# Patient Record
Sex: FEMALE | Race: WHITE | NOT HISPANIC OR LATINO | Employment: OTHER | URBAN - METROPOLITAN AREA
[De-identification: names, ages, dates, MRNs, and addresses within clinical notes are randomized per-mention and may not be internally consistent; named-entity substitution may affect disease eponyms.]

---

## 2017-12-05 ENCOUNTER — HOSPITAL ENCOUNTER (EMERGENCY)
Facility: MEDICAL CENTER | Age: 72
End: 2017-12-05
Attending: EMERGENCY MEDICINE
Payer: MEDICARE

## 2017-12-05 ENCOUNTER — APPOINTMENT (OUTPATIENT)
Dept: RADIOLOGY | Facility: MEDICAL CENTER | Age: 72
End: 2017-12-05
Attending: EMERGENCY MEDICINE
Payer: MEDICARE

## 2017-12-05 VITALS
HEIGHT: 62 IN | BODY MASS INDEX: 26.25 KG/M2 | WEIGHT: 142.64 LBS | SYSTOLIC BLOOD PRESSURE: 145 MMHG | OXYGEN SATURATION: 93 % | DIASTOLIC BLOOD PRESSURE: 69 MMHG | RESPIRATION RATE: 16 BRPM | HEART RATE: 94 BPM | TEMPERATURE: 98.2 F

## 2017-12-05 DIAGNOSIS — K52.9 ENTERITIS: ICD-10-CM

## 2017-12-05 DIAGNOSIS — R10.30 LOWER ABDOMINAL PAIN: ICD-10-CM

## 2017-12-05 LAB
ALBUMIN SERPL BCP-MCNC: 3.9 G/DL (ref 3.2–4.9)
ALBUMIN/GLOB SERPL: 1.6 G/DL
ALP SERPL-CCNC: 66 U/L (ref 30–99)
ALT SERPL-CCNC: 21 U/L (ref 2–50)
ANION GAP SERPL CALC-SCNC: 9 MMOL/L (ref 0–11.9)
APPEARANCE UR: CLEAR
AST SERPL-CCNC: 29 U/L (ref 12–45)
BACTERIA #/AREA URNS HPF: ABNORMAL /HPF
BASOPHILS # BLD AUTO: 0.1 % (ref 0–1.8)
BASOPHILS # BLD: 0.01 K/UL (ref 0–0.12)
BILIRUB SERPL-MCNC: 0.9 MG/DL (ref 0.1–1.5)
BILIRUB UR QL STRIP.AUTO: ABNORMAL
BUN SERPL-MCNC: 17 MG/DL (ref 8–22)
CALCIUM SERPL-MCNC: 8.7 MG/DL (ref 8.4–10.2)
CHLORIDE SERPL-SCNC: 104 MMOL/L (ref 96–112)
CO2 SERPL-SCNC: 24 MMOL/L (ref 20–33)
COLOR UR: YELLOW
CREAT SERPL-MCNC: 1.11 MG/DL (ref 0.5–1.4)
CULTURE IF INDICATED INDCX: YES UA CULTURE
EOSINOPHIL # BLD AUTO: 0.03 K/UL (ref 0–0.51)
EOSINOPHIL NFR BLD: 0.2 % (ref 0–6.9)
EPI CELLS #/AREA URNS HPF: ABNORMAL /HPF
ERYTHROCYTE [DISTWIDTH] IN BLOOD BY AUTOMATED COUNT: 40.9 FL (ref 35.9–50)
FLUAV+FLUBV AG SPEC QL IA: NORMAL
GFR SERPL CREATININE-BSD FRML MDRD: 48 ML/MIN/1.73 M 2
GLOBULIN SER CALC-MCNC: 2.4 G/DL (ref 1.9–3.5)
GLUCOSE SERPL-MCNC: 149 MG/DL (ref 65–99)
GLUCOSE UR STRIP.AUTO-MCNC: NEGATIVE MG/DL
HCT VFR BLD AUTO: 46.8 % (ref 37–47)
HGB BLD-MCNC: 16 G/DL (ref 12–16)
IMM GRANULOCYTES # BLD AUTO: 0.04 K/UL (ref 0–0.11)
IMM GRANULOCYTES NFR BLD AUTO: 0.3 % (ref 0–0.9)
KETONES UR STRIP.AUTO-MCNC: 15 MG/DL
LEUKOCYTE ESTERASE UR QL STRIP.AUTO: NEGATIVE
LIPASE SERPL-CCNC: 19 U/L (ref 7–58)
LYMPHOCYTES # BLD AUTO: 0.49 K/UL (ref 1–4.8)
LYMPHOCYTES NFR BLD: 3.9 % (ref 22–41)
MCH RBC QN AUTO: 30.4 PG (ref 27–33)
MCHC RBC AUTO-ENTMCNC: 34.2 G/DL (ref 33.6–35)
MCV RBC AUTO: 89 FL (ref 81.4–97.8)
MICRO URNS: ABNORMAL
MONOCYTES # BLD AUTO: 0.54 K/UL (ref 0–0.85)
MONOCYTES NFR BLD AUTO: 4.3 % (ref 0–13.4)
MUCOUS THREADS #/AREA URNS HPF: ABNORMAL /HPF
NEUTROPHILS # BLD AUTO: 11.44 K/UL (ref 2–7.15)
NEUTROPHILS NFR BLD: 91.2 % (ref 44–72)
NITRITE UR QL STRIP.AUTO: NEGATIVE
NRBC # BLD AUTO: 0 K/UL
NRBC BLD AUTO-RTO: 0 /100 WBC
PH UR STRIP.AUTO: 6.5 [PH]
PLATELET # BLD AUTO: 192 K/UL (ref 164–446)
PMV BLD AUTO: 8.8 FL (ref 9–12.9)
POTASSIUM SERPL-SCNC: 3.9 MMOL/L (ref 3.6–5.5)
PROT SERPL-MCNC: 6.3 G/DL (ref 6–8.2)
PROT UR QL STRIP: NEGATIVE MG/DL
RBC # BLD AUTO: 5.26 M/UL (ref 4.2–5.4)
RBC # URNS HPF: ABNORMAL /HPF
RBC UR QL AUTO: ABNORMAL
SIGNIFICANT IND 70042: NORMAL
SITE SITE: NORMAL
SODIUM SERPL-SCNC: 137 MMOL/L (ref 135–145)
SOURCE SOURCE: NORMAL
SP GR UR STRIP.AUTO: 1.01
WBC # BLD AUTO: 12.6 K/UL (ref 4.8–10.8)
WBC #/AREA URNS HPF: ABNORMAL /HPF

## 2017-12-05 PROCEDURE — 96374 THER/PROPH/DIAG INJ IV PUSH: CPT | Mod: XU

## 2017-12-05 PROCEDURE — 700117 HCHG RX CONTRAST REV CODE 255: Performed by: EMERGENCY MEDICINE

## 2017-12-05 PROCEDURE — 36415 COLL VENOUS BLD VENIPUNCTURE: CPT

## 2017-12-05 PROCEDURE — 87086 URINE CULTURE/COLONY COUNT: CPT

## 2017-12-05 PROCEDURE — 87400 INFLUENZA A/B EACH AG IA: CPT

## 2017-12-05 PROCEDURE — 700111 HCHG RX REV CODE 636 W/ 250 OVERRIDE (IP): Performed by: EMERGENCY MEDICINE

## 2017-12-05 PROCEDURE — 83690 ASSAY OF LIPASE: CPT

## 2017-12-05 PROCEDURE — 85025 COMPLETE CBC W/AUTO DIFF WBC: CPT

## 2017-12-05 PROCEDURE — 700102 HCHG RX REV CODE 250 W/ 637 OVERRIDE(OP): Performed by: EMERGENCY MEDICINE

## 2017-12-05 PROCEDURE — 81001 URINALYSIS AUTO W/SCOPE: CPT

## 2017-12-05 PROCEDURE — 80053 COMPREHEN METABOLIC PANEL: CPT

## 2017-12-05 PROCEDURE — 99285 EMERGENCY DEPT VISIT HI MDM: CPT

## 2017-12-05 PROCEDURE — 700105 HCHG RX REV CODE 258: Performed by: EMERGENCY MEDICINE

## 2017-12-05 PROCEDURE — A9270 NON-COVERED ITEM OR SERVICE: HCPCS | Performed by: EMERGENCY MEDICINE

## 2017-12-05 PROCEDURE — 74177 CT ABD & PELVIS W/CONTRAST: CPT

## 2017-12-05 RX ORDER — ONDANSETRON 2 MG/ML
4 INJECTION INTRAMUSCULAR; INTRAVENOUS ONCE
Status: COMPLETED | OUTPATIENT
Start: 2017-12-05 | End: 2017-12-05

## 2017-12-05 RX ORDER — DICYCLOMINE HCL 20 MG
20 TABLET ORAL ONCE
Status: COMPLETED | OUTPATIENT
Start: 2017-12-05 | End: 2017-12-05

## 2017-12-05 RX ORDER — DICYCLOMINE HCL 20 MG
20 TABLET ORAL EVERY 6 HOURS
Qty: 30 TAB | Refills: 0 | Status: SHIPPED | OUTPATIENT
Start: 2017-12-05 | End: 2019-06-17

## 2017-12-05 RX ORDER — SODIUM CHLORIDE 9 MG/ML
1000 INJECTION, SOLUTION INTRAVENOUS ONCE
Status: COMPLETED | OUTPATIENT
Start: 2017-12-05 | End: 2017-12-05

## 2017-12-05 RX ORDER — ONDANSETRON 4 MG/1
4 TABLET, ORALLY DISINTEGRATING ORAL EVERY 8 HOURS PRN
Qty: 10 TAB | Refills: 0 | Status: SHIPPED | OUTPATIENT
Start: 2017-12-05

## 2017-12-05 RX ADMIN — SODIUM CHLORIDE 1000 ML: 9 INJECTION, SOLUTION INTRAVENOUS at 18:14

## 2017-12-05 RX ADMIN — DICYCLOMINE HYDROCHLORIDE 20 MG: 20 TABLET ORAL at 18:14

## 2017-12-05 RX ADMIN — ONDANSETRON 4 MG: 2 INJECTION INTRAMUSCULAR; INTRAVENOUS at 18:14

## 2017-12-05 RX ADMIN — IOHEXOL 100 ML: 350 INJECTION, SOLUTION INTRAVENOUS at 18:59

## 2017-12-05 ASSESSMENT — PAIN SCALES - GENERAL: PAINLEVEL_OUTOF10: 9

## 2017-12-06 NOTE — ED NOTES
Discharge instructions provided.  Pt verbalized the understanding of discharge instructions to follow up with PCP and to return to ER if condition worsens.  Pt ambulated out of ER without difficulty.   Patient educated on 2 new prescriptions. Given paper prescriptions. Educated on return precautions. Educated to follow up with PCP.

## 2017-12-06 NOTE — ED PROVIDER NOTES
"ED Provider Note    CHIEF COMPLAINT  Chief Complaint   Patient presents with   • Abdominal Pain     Started at 0900 this am. Did not eat anything new, was at friend's house with flu on Sunday   • Nausea   • Diarrhea       HPI  Princess Gonsalves is a 72 y.o. female who presentsWith lower abdominal pain. This started this morning after she ate a very small breakfast. She has had waves of nausea and dry heaving. She started having some diarrhea she has had 4-5 episodes of diarrhea this morning. She does feel bloated. She has a prior history of hysterectomy and appendectomy. She denies any fevers. No shortness of breath. No recent sick contacts. The pain is pronounced in her lower abdomen that radiates to the upper abdomen. She does not attend for anything that makes it better or worse.      REVIEW OF SYSTEMS  positive for lower abdominal pain and distention diarrhea nausea, negative for fevers or dysuria. All other systems are negative.     PAST MEDICAL HISTORY   has a past medical history of Migraines.    SOCIAL HISTORY  Social History     Social History Main Topics   • Smoking status: Never Smoker   • Smokeless tobacco: Never Used   • Alcohol use No   • Drug use: No   • Sexual activity: Not on file       SURGICAL HISTORY   has a past surgical history that includes hysterectomy laparoscopy.    CURRENT MEDICATIONS  Home Medications    **Home medications have not yet been reviewed for this encounter**         ALLERGIES  Allergies   Allergen Reactions   • Codeine Itching   • Vicodin [Hydrocodone-Acetaminophen] Itching       PHYSICAL EXAM  VITAL SIGNS: /69   Pulse 94   Temp 36.8 °C (98.2 °F)   Resp 16   Ht 1.575 m (5' 2\")   Wt 64.7 kg (142 lb 10.2 oz)   SpO2 93%   BMI 26.09 kg/m² .  Constitutional: Alert in no apparent distress.  HENT: No signs of trauma, Bilateral external ears normal, Nose normal.   Eyes: Pupils are equal and reactive, Conjunctiva normal, Non-icteric.   Neck: Normal range of motion, No " tenderness, Supple, No stridor.   Cardiovascular: Regular rate and rhythm, no murmurs.   Thorax & Lungs: Normal breath sounds, No respiratory distress, No wheezing, No chest tenderness.   Abdomen: Bowel sounds normal, Soft, No tenderness, No masses, No peritoneal signs.  Skin: Warm, Dry, No erythema, No rash.   Back: No bony tenderness, No CVA tenderness.   Musculoskeletal:  no major deformities noted.   Neurologic: Alert,  No focal deficits noted.   Psychiatric: Affect normal, Judgment normal, Mood normal.       DIAGNOSTIC STUDIES / PROCEDURES        LABS  Labs Reviewed   CBC WITH DIFFERENTIAL - Abnormal; Notable for the following:        Result Value    WBC 12.6 (*)     MPV 8.8 (*)     Neutrophils-Polys 91.20 (*)     Lymphocytes 3.90 (*)     Neutrophils (Absolute) 11.44 (*)     Lymphs (Absolute) 0.49 (*)     All other components within normal limits   COMP METABOLIC PANEL - Abnormal; Notable for the following:     Glucose 149 (*)     All other components within normal limits   URINALYSIS,CULTURE IF INDICATED - Abnormal; Notable for the following:     Ketones 15 (*)     Bilirubin Small (*)     Occult Blood Trace (*)     All other components within normal limits   ESTIMATED GFR - Abnormal; Notable for the following:     GFR If  58 (*)     GFR If Non  48 (*)     All other components within normal limits   URINE MICROSCOPIC (W/UA) - Abnormal; Notable for the following:     RBC 2-5 (*)     Bacteria Few (*)     All other components within normal limits   LIPASE   INFLUENZA RAPID   URINE CULTURE(NEW)       RADIOLOGY  CT-ABDOMEN-PELVIS WITH   Final Result      1.  Mild volume of fluid throughout the small bowel may represent enteritis. No mucosal thickening or mesenteric inflammation.      2.  No evidence of colonic obstruction or inflammation.      3.  No renal obstruction.      4.  Normal appearance of the appendix and gallbladder.            COURSE & MEDICAL DECISION MAKING  Pertinent  Labs & Imaging studies reviewed. (See chart for details)  This is a 72-year-old female presenting with lower abdominal pain. She has acute onset of symptoms of diarrhea and nausea. Differential includes but is not limited to dehydration, enteritis, diverticulitis.    Laboratory evaluation shows a slightly elevated white blood cell count with left shift. CMP shows essentially normal electrolytes. Urinalysis does not show any evidence of infection. She was given IV fluids for presumed dehydration given her diarrhea and nausea. CT was performed. There does not appear to be any evidence of diverticulitis. She does have some fluid throughout the small bowel which may represent enteritis. I do think this is consistent with her symptoms. Do not think she requires antibiotics at this time she is not febrile. She will be managed symptomatically with Zofran and Bentyl. She did have these in the emergency department with some improvement of her symptoms. I do think she can be treated as an outpatient at this time.  She was agreeable to this and will return for any worsening symptoms.      Your blood pressure is elevated here in the emergency department. Please monitor your blood pressure over the next several days. If your blood pressure continues to be 120/80 or higher please contact your physician for blood pressure management.      The patient will return for new or worsening symptoms and is stable at the time of discharge. Patient was given return precautions. Patient and/or family member verbalizes understanding and will comply.    DISPOSITION:  Patient will be discharged home in stable condition.    FOLLOW UP:  [facility], Emergency Dept  [address]  [place] [id number]  [phone]    return for worsening pain, fevers, vomiting or other concerns. Please follow-up with your primary care doctor your blood pressure is slightly elevated.      OUTPATIENT MEDICATIONS:  Discharge  Medication List as of 12/5/2017  8:47 PM      START taking these medications    Details   ondansetron (ZOFRAN ODT) 4 MG TABLET DISPERSIBLE Take 1 Tab by mouth every 8 hours as needed., Disp-10 Tab, R-0, Print Rx Paper      dicyclomine (BENTYL) 20 MG Tab Take 1 Tab by mouth every 6 hours.PRN for bowel spasmsDisp-30 Tab, R-0, Print Rx Paper               FINAL IMPRESSION  1. Enteritis    2. Lower abdominal pain        2.   3.    This dictation has been creating using voice recognition software. The accuracy of the dictation is limited the abilities of the software.  I expect there may be some errors of grammar and possibly content. I made every attempt to manually correct the errors within my dictation. However errors related to this voice recognition software may still exist and should be interpreted within the appropriate context.      The note accurately reflects work and decisions made by me.  Carly Hanna  12/5/2017  10:23 PM

## 2017-12-06 NOTE — DISCHARGE INSTRUCTIONS
Abdominal Pain (Nonspecific)  Your exam might not show the exact reason you have abdominal pain. Since there are many different causes of abdominal pain, another checkup and more tests may be needed. It is very important to follow up for lasting (persistent) or worsening symptoms. A possible cause of abdominal pain in any person who still has his or her appendix is acute appendicitis. Appendicitis is often hard to diagnose. Normal blood tests, urine tests, ultrasound, and CT scans do not completely rule out early appendicitis or other causes of abdominal pain. Sometimes, only the changes that happen over time will allow appendicitis and other causes of abdominal pain to be determined. Other potential problems that may require surgery may also take time to become more apparent. Because of this, it is important that you follow all of the instructions below.  HOME CARE INSTRUCTIONS   · Rest as much as possible.   · Do not eat solid food until your pain is gone.   · While adults or children have pain: A diet of water, weak decaffeinated tea, broth or bouillon, gelatin, oral rehydration solutions (ORS), frozen ice pops, or ice chips may be helpful.   · When pain is gone in adults or children: Start a light diet (dry toast, crackers, applesauce, or white rice). Increase the diet slowly as long as it does not bother you. Eat no dairy products (including cheese and eggs) and no spicy, fatty, fried, or high-fiber foods.   · Use no alcohol, caffeine, or cigarettes.   · Take your regular medicines unless your caregiver told you not to.   · Take any prescribed medicine as directed.   · Only take over-the-counter or prescription medicines for pain, discomfort, or fever as directed by your caregiver. Do not give aspirin to children.   If your caregiver has given you a follow-up appointment, it is very important to keep that appointment. Not keeping the appointment could result in a permanent injury and/or lasting (chronic) pain  and/or disability. If there is any problem keeping the appointment, you must call to reschedule.   SEEK IMMEDIATE MEDICAL CARE IF:   · Your pain is not gone in 24 hours.   · Your pain becomes worse, changes location, or feels different.   · You or your child has an oral temperature above 102° F (38.9° C), not controlled by medicine.   · Your baby is older than 3 months with a rectal temperature of 102° F (38.9° C) or higher.   · Your baby is 3 months old or younger with a rectal temperature of 100.4° F (38° C) or higher.   · You have shaking chills.   · You keep throwing up (vomiting) or cannot drink liquids.   · There is blood in your vomit or you see blood in your bowel movements.   · Your bowel movements become dark or black.   · You have frequent bowel movements.   · Your bowel movements stop (become blocked) or you cannot pass gas.   · You have bloody, frequent, or painful urination.   · You have yellow discoloration in the skin or whites of the eyes.   · Your stomach becomes bloated or bigger.   · You have dizziness or fainting.   · You have chest or back pain.   MAKE SURE YOU:   · Understand these instructions.   · Will watch your condition.   · Will get help right away if you are not doing well or get worse.   Document Released: 12/18/2006 Document Revised: 03/11/2013 Document Reviewed: 11/15/2010  [employer]® Patient Information ©2013 [employer].Viral Gastroenteritis  Viral gastroenteritis is also known as stomach flu. This condition affects the stomach and intestinal tract. It can cause sudden diarrhea and vomiting. The illness typically lasts 3 to 8 days. Most people develop an immune response that eventually gets rid of the virus. While this natural response develops, the virus can make you quite ill.  CAUSES   Many different viruses can cause gastroenteritis, such as rotavirus or noroviruses. You can catch one of these viruses by consuming contaminated food or water. You may also catch a virus by  sharing utensils or other personal items with an infected person or by touching a contaminated surface.  SYMPTOMS   The most common symptoms are diarrhea and vomiting. These problems can cause a severe loss of body fluids (dehydration) and a body salt (electrolyte) imbalance. Other symptoms may include:  · Fever.  · Headache.  · Fatigue.  · Abdominal pain.  DIAGNOSIS   Your caregiver can usually diagnose viral gastroenteritis based on your symptoms and a physical exam. A stool sample may also be taken to test for the presence of viruses or other infections.  TREATMENT   This illness typically goes away on its own. Treatments are aimed at rehydration. The most serious cases of viral gastroenteritis involve vomiting so severely that you are not able to keep fluids down. In these cases, fluids must be given through an intravenous line (IV).  HOME CARE INSTRUCTIONS   · Drink enough fluids to keep your urine clear or pale yellow. Drink small amounts of fluids frequently and increase the amounts as tolerated.  · Ask your caregiver for specific rehydration instructions.  · Avoid:  ¨ Foods high in sugar.  ¨ Alcohol.  ¨ Carbonated drinks.  ¨ Tobacco.  ¨ Juice.  ¨ Caffeine drinks.  ¨ Extremely hot or cold fluids.  ¨ Fatty, greasy foods.  ¨ Too much intake of anything at one time.  ¨ Dairy products until 24 to 48 hours after diarrhea stops.  · You may consume probiotics. Probiotics are active cultures of beneficial bacteria. They may lessen the amount and number of diarrheal stools in adults. Probiotics can be found in yogurt with active cultures and in supplements.  · Wash your hands well to avoid spreading the virus.  · Only take over-the-counter or prescription medicines for pain, discomfort, or fever as directed by your caregiver. Do not give aspirin to children. Antidiarrheal medicines are not recommended.  · Ask your caregiver if you should continue to take your regular prescribed and over-the-counter medicines.  · Keep  all follow-up appointments as directed by your caregiver.  SEEK IMMEDIATE MEDICAL CARE IF:   · You are unable to keep fluids down.  · You do not urinate at least once every 6 to 8 hours.  · You develop shortness of breath.  · You notice blood in your stool or vomit. This may look like coffee grounds.  · You have abdominal pain that increases or is concentrated in one small area (localized).  · You have persistent vomiting or diarrhea.  · You have a fever.  · The patient is a child younger than 3 months, and he or she has a fever.  · The patient is a child older than 3 months, and he or she has a fever and persistent symptoms.  · The patient is a child older than 3 months, and he or she has a fever and symptoms suddenly get worse.  · The patient is a baby, and he or she has no tears when crying.  MAKE SURE YOU:   · Understand these instructions.  · Will watch your condition.  · Will get help right away if you are not doing well or get worse.     This information is not intended to replace advice given to you by your health care provider. Make sure you discuss any questions you have with your health care provider.     Document Released: 12/18/2006 Document Revised: 03/11/2013 Document Reviewed: 10/03/2012  [employer] Patient Education ©2016 [employer]

## 2017-12-06 NOTE — ED NOTES
"Chief Complaint   Patient presents with   • Abdominal Pain     Started at 0900 this am. Did not eat anything new, was at friend's house with flu on Sunday   • Nausea   • Diarrhea     /76   Pulse (!) 116   Temp 36.8 °C (98.2 °F)   Resp 18   Ht 1.575 m (5' 2\")   Wt 64.7 kg (142 lb 10.2 oz)   SpO2 97%   BMI 26.09 kg/m²     "

## 2017-12-08 LAB
BACTERIA UR CULT: NORMAL
SIGNIFICANT IND 70042: NORMAL
SITE SITE: NORMAL
SOURCE SOURCE: NORMAL

## 2019-06-17 ENCOUNTER — HOSPITAL ENCOUNTER (EMERGENCY)
Facility: MEDICAL CENTER | Age: 74
End: 2019-06-17
Attending: EMERGENCY MEDICINE
Payer: MEDICARE

## 2019-06-17 VITALS
BODY MASS INDEX: 25.88 KG/M2 | RESPIRATION RATE: 20 BRPM | DIASTOLIC BLOOD PRESSURE: 103 MMHG | HEIGHT: 62 IN | TEMPERATURE: 97.8 F | OXYGEN SATURATION: 93 % | WEIGHT: 140.65 LBS | SYSTOLIC BLOOD PRESSURE: 149 MMHG | HEART RATE: 66 BPM

## 2019-06-17 DIAGNOSIS — R51.9 NONINTRACTABLE HEADACHE, UNSPECIFIED CHRONICITY PATTERN, UNSPECIFIED HEADACHE TYPE: ICD-10-CM

## 2019-06-17 DIAGNOSIS — R11.0 NAUSEA: ICD-10-CM

## 2019-06-17 LAB
ALBUMIN SERPL BCP-MCNC: 4 G/DL (ref 3.2–4.9)
ALBUMIN/GLOB SERPL: 1.4 G/DL
ALP SERPL-CCNC: 48 U/L (ref 30–99)
ALT SERPL-CCNC: 16 U/L (ref 2–50)
ANION GAP SERPL CALC-SCNC: 8 MMOL/L (ref 0–11.9)
AST SERPL-CCNC: 20 U/L (ref 12–45)
BASOPHILS # BLD AUTO: 0.1 % (ref 0–1.8)
BASOPHILS # BLD: 0.01 K/UL (ref 0–0.12)
BILIRUB SERPL-MCNC: 0.6 MG/DL (ref 0.1–1.5)
BUN SERPL-MCNC: 13 MG/DL (ref 8–22)
CALCIUM SERPL-MCNC: 9.5 MG/DL (ref 8.4–10.2)
CHLORIDE SERPL-SCNC: 106 MMOL/L (ref 96–112)
CO2 SERPL-SCNC: 28 MMOL/L (ref 20–33)
CREAT SERPL-MCNC: 1.11 MG/DL (ref 0.5–1.4)
EOSINOPHIL # BLD AUTO: 0.09 K/UL (ref 0–0.51)
EOSINOPHIL NFR BLD: 1.2 % (ref 0–6.9)
ERYTHROCYTE [DISTWIDTH] IN BLOOD BY AUTOMATED COUNT: 40.9 FL (ref 35.9–50)
GLOBULIN SER CALC-MCNC: 2.8 G/DL (ref 1.9–3.5)
GLUCOSE SERPL-MCNC: 110 MG/DL (ref 65–99)
HCT VFR BLD AUTO: 46.6 % (ref 37–47)
HGB BLD-MCNC: 15.3 G/DL (ref 12–16)
IMM GRANULOCYTES # BLD AUTO: 0.02 K/UL (ref 0–0.11)
IMM GRANULOCYTES NFR BLD AUTO: 0.3 % (ref 0–0.9)
LIPASE SERPL-CCNC: 34 U/L (ref 7–58)
LYMPHOCYTES # BLD AUTO: 2.06 K/UL (ref 1–4.8)
LYMPHOCYTES NFR BLD: 28.4 % (ref 22–41)
MCH RBC QN AUTO: 30 PG (ref 27–33)
MCHC RBC AUTO-ENTMCNC: 32.8 G/DL (ref 33.6–35)
MCV RBC AUTO: 91.4 FL (ref 81.4–97.8)
MONOCYTES # BLD AUTO: 0.5 K/UL (ref 0–0.85)
MONOCYTES NFR BLD AUTO: 6.9 % (ref 0–13.4)
NEUTROPHILS # BLD AUTO: 4.57 K/UL (ref 2–7.15)
NEUTROPHILS NFR BLD: 63.1 % (ref 44–72)
NRBC # BLD AUTO: 0 K/UL
NRBC BLD-RTO: 0 /100 WBC
PLATELET # BLD AUTO: 198 K/UL (ref 164–446)
PMV BLD AUTO: 8.8 FL (ref 9–12.9)
POTASSIUM SERPL-SCNC: 4 MMOL/L (ref 3.6–5.5)
PROT SERPL-MCNC: 6.8 G/DL (ref 6–8.2)
RBC # BLD AUTO: 5.1 M/UL (ref 4.2–5.4)
SODIUM SERPL-SCNC: 142 MMOL/L (ref 135–145)
WBC # BLD AUTO: 7.3 K/UL (ref 4.8–10.8)

## 2019-06-17 PROCEDURE — 700111 HCHG RX REV CODE 636 W/ 250 OVERRIDE (IP): Performed by: EMERGENCY MEDICINE

## 2019-06-17 PROCEDURE — 96375 TX/PRO/DX INJ NEW DRUG ADDON: CPT

## 2019-06-17 PROCEDURE — 80053 COMPREHEN METABOLIC PANEL: CPT

## 2019-06-17 PROCEDURE — 85025 COMPLETE CBC W/AUTO DIFF WBC: CPT

## 2019-06-17 PROCEDURE — 83690 ASSAY OF LIPASE: CPT

## 2019-06-17 PROCEDURE — 36415 COLL VENOUS BLD VENIPUNCTURE: CPT

## 2019-06-17 PROCEDURE — 99285 EMERGENCY DEPT VISIT HI MDM: CPT

## 2019-06-17 PROCEDURE — 96374 THER/PROPH/DIAG INJ IV PUSH: CPT

## 2019-06-17 RX ORDER — ONDANSETRON 4 MG/1
4 TABLET, ORALLY DISINTEGRATING ORAL EVERY 6 HOURS PRN
Qty: 10 TAB | Refills: 0 | Status: SHIPPED | OUTPATIENT
Start: 2019-06-17

## 2019-06-17 RX ORDER — KETOROLAC TROMETHAMINE 30 MG/ML
15 INJECTION, SOLUTION INTRAMUSCULAR; INTRAVENOUS ONCE
Status: COMPLETED | OUTPATIENT
Start: 2019-06-17 | End: 2019-06-17

## 2019-06-17 RX ORDER — MORPHINE SULFATE 4 MG/ML
4 INJECTION, SOLUTION INTRAMUSCULAR; INTRAVENOUS ONCE
Status: DISCONTINUED | OUTPATIENT
Start: 2019-06-17 | End: 2019-06-18 | Stop reason: HOSPADM

## 2019-06-17 RX ORDER — ONDANSETRON 4 MG/1
4 TABLET, ORALLY DISINTEGRATING ORAL ONCE
Status: DISCONTINUED | OUTPATIENT
Start: 2019-06-17 | End: 2019-06-18 | Stop reason: HOSPADM

## 2019-06-17 RX ORDER — ONDANSETRON 2 MG/ML
4 INJECTION INTRAMUSCULAR; INTRAVENOUS ONCE
Status: COMPLETED | OUTPATIENT
Start: 2019-06-17 | End: 2019-06-17

## 2019-06-17 RX ADMIN — ONDANSETRON 4 MG: 2 INJECTION INTRAMUSCULAR; INTRAVENOUS at 21:10

## 2019-06-17 RX ADMIN — KETOROLAC TROMETHAMINE 15 MG: 30 INJECTION, SOLUTION INTRAMUSCULAR; INTRAVENOUS at 21:11

## 2019-06-18 NOTE — DISCHARGE INSTRUCTIONS
Go home and get some rest, use nausea medication as prescribed.  Return if you develop abdominal pain or any changing symptoms.

## 2019-06-18 NOTE — ED TRIAGE NOTES
"Chief Complaint   Patient presents with   • Abdominal Pain     pt c/o lower abdominal pain since yesterday.   • N/V     since this am   • Headache     pt c/o headache today, took sumatriptan with no relief     /103   Pulse 79   Temp 36.6 °C (97.8 °F) (Temporal)   Resp 18   Ht 1.575 m (5' 2\")   Wt 63.8 kg (140 lb 10.5 oz)   SpO2 95%   BMI 25.73 kg/m²     Pt ambulates to triage with stable gait, alert and oriented.      "

## 2019-06-18 NOTE — ED PROVIDER NOTES
ED Provider Note    ED Provider    Means of arrival: Private vehicle  History obtained from: Patient  History limited by: None    CHIEF COMPLAINT  Chief Complaint   Patient presents with   • Abdominal Pain     pt c/o lower abdominal pain since yesterday.   • N/V     since this am   • Headache     pt c/o headache today, took sumatriptan with no relief       HPI  Princess Gonsalves is a 73 y.o. female who presents complaints of headache, and nausea and abdominal bloating.  She started getting nauseous earlier today, with bloating.  She did have some vomiting.  She has not vomited for several hours.  She developed a headache, frontal and now global.  This started out mild and is been progressively worsening.  She has a history of migraine she took Imitrex and this did not help.  She has no numbness tingling or weakness, no trouble walking talking or holding things no recent fevers, no neck pain or stiffness.    She has no abdominal pain just primarily nausea and a bloating sensation.    REVIEW OF SYSTEMS  See HPI for further details. All other systems are negative.     PAST MEDICAL HISTORY   has a past medical history of Migraines.    SOCIAL HISTORY  Social History     Social History Main Topics   • Smoking status: Never Smoker   • Smokeless tobacco: Never Used   • Alcohol use No   • Drug use: No   • Sexual activity: Not on file       SURGICAL HISTORY   has a past surgical history that includes hysterectomy laparoscopy.    CURRENT MEDICATIONS  Home Medications     Reviewed by Crystal Vivar R.N. (Registered Nurse) on 06/17/19 at 2029  Med List Status: Partial   Medication Last Dose Status   Atorvastatin Calcium (LIPITOR PO) 6/16/2019 Active   ondansetron (ZOFRAN ODT) 4 MG TABLET DISPERSIBLE 6/17/2019 Active   SUMATRIPTAN SUCCINATE PO 6/17/2019 Active   Zolpidem Tartrate (AMBIEN PO) 6/16/2019 Active                ALLERGIES  Allergies   Allergen Reactions   • Codeine Itching   • Vicodin  "[Hydrocodone-Acetaminophen] Itching       PHYSICAL EXAM  VITAL SIGNS: /103   Pulse 66   Temp 36.6 °C (97.8 °F) (Temporal)   Resp (!) 30   Ht 1.575 m (5' 2\")   Wt 63.8 kg (140 lb 10.5 oz)   SpO2 93%   BMI 25.73 kg/m²   Constitutional: Alert in no apparent distress.  HENT: No signs of trauma, Mucous membranes are moist   Eyes:  Conjunctiva normal, Non-icteric.   Neck: Normal range of motion, No tenderness, Supple,  Lymphatic: No lymphadenopathy noted.   Cardiovascular: Regular rate and rhythm, no murmurs.   Thorax & Lungs: Normal breath sounds, No respiratory distress, No wheezing, No chest tenderness.   Abdomen: Bowel sounds normal, Soft, No tenderness, No masses, No pulsatile masses. No peritoneal signs.  Skin: Warm, Dry,Normal color  Back: No bony tenderness, No CVA tenderness.   Extremities:No edema, No tenderness, No cyanosis,    Musculoskeletal: Good range of motion in all major joints. No tenderness to palpation or major deformities noted.   Neurologic: Alert ,Oriented x4, Normal motor function, Normal sensory function, No focal deficits noted.   Psychiatric: Affect normal, Judgment normal, Mood normal.       MEDICAL DECISION MAKING  This is a 73 y.o. female who presents symptoms as described above.  She was medicated for pain with good results her headache did resolve and nausea resolved with medications.  Lab test shows no sign of elevated white blood cell count no significant electrolyte disorder    DIAGNOSTIC STUDIES / PROCEDURES    EKG      LABS  Results for orders placed or performed during the hospital encounter of 06/17/19   CBC WITH DIFFERENTIAL   Result Value Ref Range    WBC 7.3 4.8 - 10.8 K/uL    RBC 5.10 4.20 - 5.40 M/uL    Hemoglobin 15.3 12.0 - 16.0 g/dL    Hematocrit 46.6 37.0 - 47.0 %    MCV 91.4 81.4 - 97.8 fL    MCH 30.0 27.0 - 33.0 pg    MCHC 32.8 (L) 33.6 - 35.0 g/dL    RDW 40.9 35.9 - 50.0 fL    Platelet Count 198 164 - 446 K/uL    MPV 8.8 (L) 9.0 - 12.9 fL    " Neutrophils-Polys 63.10 44.00 - 72.00 %    Lymphocytes 28.40 22.00 - 41.00 %    Monocytes 6.90 0.00 - 13.40 %    Eosinophils 1.20 0.00 - 6.90 %    Basophils 0.10 0.00 - 1.80 %    Immature Granulocytes 0.30 0.00 - 0.90 %    Nucleated RBC 0.00 /100 WBC    Neutrophils (Absolute) 4.57 2.00 - 7.15 K/uL    Lymphs (Absolute) 2.06 1.00 - 4.80 K/uL    Monos (Absolute) 0.50 0.00 - 0.85 K/uL    Eos (Absolute) 0.09 0.00 - 0.51 K/uL    Baso (Absolute) 0.01 0.00 - 0.12 K/uL    Immature Granulocytes (abs) 0.02 0.00 - 0.11 K/uL    NRBC (Absolute) 0.00 K/uL   COMP METABOLIC PANEL   Result Value Ref Range    Sodium 142 135 - 145 mmol/L    Potassium 4.0 3.6 - 5.5 mmol/L    Chloride 106 96 - 112 mmol/L    Co2 28 20 - 33 mmol/L    Anion Gap 8.0 0.0 - 11.9    Glucose 110 (H) 65 - 99 mg/dL    Bun 13 8 - 22 mg/dL    Creatinine 1.11 0.50 - 1.40 mg/dL    Calcium 9.5 8.4 - 10.2 mg/dL    AST(SGOT) 20 12 - 45 U/L    ALT(SGPT) 16 2 - 50 U/L    Alkaline Phosphatase 48 30 - 99 U/L    Total Bilirubin 0.6 0.1 - 1.5 mg/dL    Albumin 4.0 3.2 - 4.9 g/dL    Total Protein 6.8 6.0 - 8.2 g/dL    Globulin 2.8 1.9 - 3.5 g/dL    A-G Ratio 1.4 g/dL   LIPASE   Result Value Ref Range    Lipase 34 7 - 58 U/L   ESTIMATED GFR   Result Value Ref Range    GFR If  58 (A) >60 mL/min/1.73 m 2    GFR If Non  48 (A) >60 mL/min/1.73 m 2         RADIOLOGY  No orders to display       COURSE  Pertinent Labs & Imaging studies reviewed. (See chart for details)    9:48 PM - Patient seen and examined at bedside. Discussed plan of care,   FINAL IMPRESSION  1. Nausea    2. Nonintractable headache, unspecified chronicity pattern, unspecified headache type